# Patient Record
Sex: MALE | Race: WHITE | NOT HISPANIC OR LATINO | Employment: OTHER | URBAN - METROPOLITAN AREA
[De-identification: names, ages, dates, MRNs, and addresses within clinical notes are randomized per-mention and may not be internally consistent; named-entity substitution may affect disease eponyms.]

---

## 2023-01-29 PROBLEM — G47.33 OSA (OBSTRUCTIVE SLEEP APNEA): Status: ACTIVE | Noted: 2023-01-29

## 2023-01-29 PROBLEM — I51.7 LEFT VENTRICULAR HYPERTROPHY: Status: ACTIVE | Noted: 2023-01-29

## 2023-01-29 PROBLEM — R00.2 PALPITATIONS: Status: ACTIVE | Noted: 2023-01-29

## 2023-01-29 PROBLEM — D16.4 OSTEOMA OF EAR CANAL: Status: ACTIVE | Noted: 2023-01-29

## 2023-01-29 PROBLEM — R79.89 HIGH SERUM CREATINE: Status: ACTIVE | Noted: 2023-01-29

## 2023-01-29 PROBLEM — K64.5 EXTERNAL HEMORRHOID, THROMBOSED: Status: ACTIVE | Noted: 2023-01-29

## 2023-01-29 PROBLEM — I10 ESSENTIAL HYPERTENSION: Status: ACTIVE | Noted: 2023-01-29

## 2023-01-29 PROBLEM — E78.5 BORDERLINE HYPERLIPIDEMIA: Status: ACTIVE | Noted: 2023-01-29

## 2023-01-29 PROBLEM — R14.0 BLOATING: Status: ACTIVE | Noted: 2023-01-29

## 2023-01-29 PROBLEM — R73.09 ELEVATED GLUCOSE: Status: ACTIVE | Noted: 2023-01-29

## 2023-01-29 PROBLEM — I48.0 PAF (PAROXYSMAL ATRIAL FIBRILLATION) (MULTI): Status: ACTIVE | Noted: 2023-01-29

## 2023-01-29 PROBLEM — I71.20 THORACIC AORTIC ANEURYSM (TAA) (CMS-HCC): Status: ACTIVE | Noted: 2023-01-29

## 2023-01-29 PROBLEM — L30.9 DERMATITIS: Status: ACTIVE | Noted: 2023-01-29

## 2023-01-29 PROBLEM — R39.12 POOR URINARY STREAM: Status: ACTIVE | Noted: 2023-01-29

## 2023-01-29 RX ORDER — AMLODIPINE BESYLATE 5 MG/1
1 TABLET ORAL DAILY
COMMUNITY

## 2023-01-29 RX ORDER — METOPROLOL TARTRATE 25 MG/1
0.5 TABLET, FILM COATED ORAL 2 TIMES DAILY
COMMUNITY

## 2023-03-14 ENCOUNTER — OFFICE VISIT (OUTPATIENT)
Dept: PRIMARY CARE | Facility: CLINIC | Age: 68
End: 2023-03-14
Payer: MEDICARE

## 2023-03-14 VITALS
DIASTOLIC BLOOD PRESSURE: 82 MMHG | TEMPERATURE: 97.6 F | SYSTOLIC BLOOD PRESSURE: 128 MMHG | BODY MASS INDEX: 31.38 KG/M2 | WEIGHT: 254.4 LBS

## 2023-03-14 DIAGNOSIS — R39.12 POOR URINARY STREAM: ICD-10-CM

## 2023-03-14 DIAGNOSIS — G47.33 OSA (OBSTRUCTIVE SLEEP APNEA): ICD-10-CM

## 2023-03-14 DIAGNOSIS — I48.0 PAF (PAROXYSMAL ATRIAL FIBRILLATION) (MULTI): ICD-10-CM

## 2023-03-14 DIAGNOSIS — K76.0 FATTY LIVER: ICD-10-CM

## 2023-03-14 DIAGNOSIS — I71.20 THORACIC AORTIC ANEURYSM WITHOUT RUPTURE, UNSPECIFIED PART (CMS-HCC): ICD-10-CM

## 2023-03-14 DIAGNOSIS — R73.09 ELEVATED GLUCOSE: ICD-10-CM

## 2023-03-14 DIAGNOSIS — I10 ESSENTIAL HYPERTENSION: Primary | ICD-10-CM

## 2023-03-14 DIAGNOSIS — E78.5 BORDERLINE HYPERLIPIDEMIA: ICD-10-CM

## 2023-03-14 PROBLEM — R14.0 BLOATING: Status: RESOLVED | Noted: 2023-01-29 | Resolved: 2023-03-14

## 2023-03-14 PROBLEM — K64.5 EXTERNAL HEMORRHOID, THROMBOSED: Status: RESOLVED | Noted: 2023-01-29 | Resolved: 2023-03-14

## 2023-03-14 PROBLEM — R00.2 PALPITATIONS: Status: RESOLVED | Noted: 2023-01-29 | Resolved: 2023-03-14

## 2023-03-14 PROCEDURE — 1159F MED LIST DOCD IN RCRD: CPT | Performed by: INTERNAL MEDICINE

## 2023-03-14 PROCEDURE — 3079F DIAST BP 80-89 MM HG: CPT | Performed by: INTERNAL MEDICINE

## 2023-03-14 PROCEDURE — 1036F TOBACCO NON-USER: CPT | Performed by: INTERNAL MEDICINE

## 2023-03-14 PROCEDURE — 99214 OFFICE O/P EST MOD 30 MIN: CPT | Performed by: INTERNAL MEDICINE

## 2023-03-14 PROCEDURE — 3074F SYST BP LT 130 MM HG: CPT | Performed by: INTERNAL MEDICINE

## 2023-03-14 ASSESSMENT — PATIENT HEALTH QUESTIONNAIRE - PHQ9
2. FEELING DOWN, DEPRESSED OR HOPELESS: NOT AT ALL
SUM OF ALL RESPONSES TO PHQ9 QUESTIONS 1 AND 2: 0
1. LITTLE INTEREST OR PLEASURE IN DOING THINGS: NOT AT ALL

## 2023-03-14 NOTE — PATIENT INSTRUCTIONS
Have blood work in 2 to 3 months.  Keep up the excellent lifestyle changes.  Exercise.  Please make an appointment with your gastroenterologist for a follow-up colonoscopy.  Lets get back together in 6 months

## 2023-03-14 NOTE — PROGRESS NOTES
Subjective   Patient ID: Britton Hercules is a 67 y.o. male who presents for Follow-up (6 MONTH).    HPI   #1 hypertension-working on Dash diet.  Weight trending down.  Following closely with cardiology  #2 MARSHA-using AutoPap nightly.  #3 LVH-on hypertensive treatment.  Following closely with cardiology   #4 tooth abscess- resolved. f/u dentist.   #5 elevated glucose-weight trending down   #6 health maintenance  #7 LBP after a fall-resolved  #8 PAF- on OAC. doing well. f/u cards.  No bleeding  #9 TAA- f/u cards, ultrasound of abdomen normal.  Underwent CTA recently by cardiology   #10 colon polyps-did not have scope last year.  No GI complaints    Review of Systems all systems reviewed and negative except as per history of present illness    Objective   /82   Temp 36.4 °C (97.6 °F)   Wt 115 kg (254 lb 6.4 oz)   BMI 31.38 kg/m²     Physical Exam  Constitutional:       General: He is not in acute distress.     Appearance: Normal appearance. He is not ill-appearing or toxic-appearing.   Cardiovascular:      Rate and Rhythm: Normal rate and regular rhythm.      Heart sounds: No murmur heard.  Pulmonary:      Effort: Pulmonary effort is normal.      Breath sounds: Normal breath sounds.   Abdominal:      General: Abdomen is flat.      Palpations: Abdomen is soft. There is no mass.      Tenderness: There is no abdominal tenderness.   Neurological:      Mental Status: He is alert and oriented to person, place, and time.   Psychiatric:         Mood and Affect: Mood normal.         Thought Content: Thought content normal.         Judgment: Judgment normal.       Lab Results   Component Value Date    WBC 6.8 09/20/2022    HGB 14.8 09/20/2022    HCT 44.3 09/20/2022     09/20/2022    CHOL 180 09/20/2022    TRIG 91 09/20/2022    HDL 43.3 09/20/2022    ALT 25 09/20/2022    AST 16 01/27/2019     09/20/2022    K 4.4 09/20/2022     09/20/2022    CREATININE 1.22 09/20/2022    BUN 23 09/20/2022    CO2 27  09/20/2022    PSA 0.75 09/20/2022    INR 1.1 01/27/2019    HGBA1C 5.6 09/20/2022     === 10/04/22 ===    US ABDOMEN COMPLETE    - Impression -  1.  Hepatic steatosis.  2. Poor visualization of the pancreas.    Otherwise, limited, but grossly unremarkable ultrasound of the  abdomen.     Assessment/Plan   Problem List Items Addressed This Visit          Nervous    MARSHA (obstructive sleep apnea)       Circulatory    Essential hypertension - Primary    Relevant Orders    Basic Metabolic Panel    CBC    PAF (paroxysmal atrial fibrillation) (CMS/HCC)    Thoracic aortic aneurysm (TAA)       Other    Borderline hyperlipidemia    Relevant Orders    Alanine Aminotransferase    Lipid Panel    Elevated glucose    Relevant Orders    Hemoglobin A1C    Poor urinary stream    Relevant Orders    Prostate Specific Antigen     Other Visit Diagnoses       Fatty liver              #1 hypertension- stable on low dose amlodipine. Diet and exercise reviewed. Continue to check at home.   #2 MARSHA- moderate severity. Doing well, continue AutoPap 5-15.    #3 LVH- stable. Blood pressure well-controlled  #4 tooth abscess- resolved. f/u dentist.   #5 elevated glucose- diet and exercise reviewed. labs  #6 health maintenance- check basic labs. Recommend flu vaccine, he declines. Also declines shingles vaccine. Patient had colonoscopy (+polyps) 4/17--> pending now. reviewed importance w/ pt. stressed.   #7 LBP after a fall-normal exam. Resolving. Offered physical therapy. Declines.  #8 PAF- on OAC. doing well. f/u cards.   #9 TAA- f/u cards,   #10 colon polyps- due for colonoscopy last year, reviewed.  He will call and sched ASAP.  Stressed importance  #11-fatty liver on ultrasound.  Reviewed.  Follow labs.  Diet and exercise/low-carb/sugar discussed.     rec covid/flu/shingrix.clines- he declines

## 2023-09-14 ENCOUNTER — OFFICE VISIT (OUTPATIENT)
Dept: PRIMARY CARE | Facility: CLINIC | Age: 68
End: 2023-09-14
Payer: MEDICARE

## 2023-09-14 VITALS
DIASTOLIC BLOOD PRESSURE: 80 MMHG | BODY MASS INDEX: 32.14 KG/M2 | SYSTOLIC BLOOD PRESSURE: 118 MMHG | TEMPERATURE: 97.4 F | WEIGHT: 260.6 LBS

## 2023-09-14 DIAGNOSIS — Z00.00 REGULAR CHECK-UP: Primary | ICD-10-CM

## 2023-09-14 DIAGNOSIS — G47.33 OSA (OBSTRUCTIVE SLEEP APNEA): ICD-10-CM

## 2023-09-14 DIAGNOSIS — E78.5 BORDERLINE HYPERLIPIDEMIA: ICD-10-CM

## 2023-09-14 DIAGNOSIS — R39.12 POOR URINARY STREAM: ICD-10-CM

## 2023-09-14 DIAGNOSIS — H66.90 ACUTE OTITIS MEDIA, UNSPECIFIED OTITIS MEDIA TYPE: ICD-10-CM

## 2023-09-14 DIAGNOSIS — I10 ESSENTIAL HYPERTENSION: ICD-10-CM

## 2023-09-14 DIAGNOSIS — R79.89 HIGH SERUM CREATINE: ICD-10-CM

## 2023-09-14 DIAGNOSIS — I48.0 PAF (PAROXYSMAL ATRIAL FIBRILLATION) (MULTI): ICD-10-CM

## 2023-09-14 DIAGNOSIS — R73.09 ELEVATED GLUCOSE: ICD-10-CM

## 2023-09-14 DIAGNOSIS — E66.9 CLASS 1 OBESITY WITH SERIOUS COMORBIDITY AND BODY MASS INDEX (BMI) OF 32.0 TO 32.9 IN ADULT, UNSPECIFIED OBESITY TYPE: ICD-10-CM

## 2023-09-14 PROCEDURE — 1170F FXNL STATUS ASSESSED: CPT | Performed by: INTERNAL MEDICINE

## 2023-09-14 PROCEDURE — 1160F RVW MEDS BY RX/DR IN RCRD: CPT | Performed by: INTERNAL MEDICINE

## 2023-09-14 PROCEDURE — 3079F DIAST BP 80-89 MM HG: CPT | Performed by: INTERNAL MEDICINE

## 2023-09-14 PROCEDURE — 1159F MED LIST DOCD IN RCRD: CPT | Performed by: INTERNAL MEDICINE

## 2023-09-14 PROCEDURE — G0439 PPPS, SUBSEQ VISIT: HCPCS | Performed by: INTERNAL MEDICINE

## 2023-09-14 PROCEDURE — 3074F SYST BP LT 130 MM HG: CPT | Performed by: INTERNAL MEDICINE

## 2023-09-14 PROCEDURE — 1036F TOBACCO NON-USER: CPT | Performed by: INTERNAL MEDICINE

## 2023-09-14 PROCEDURE — 3008F BODY MASS INDEX DOCD: CPT | Performed by: INTERNAL MEDICINE

## 2023-09-14 RX ORDER — AMOXICILLIN 875 MG/1
875 TABLET, FILM COATED ORAL 2 TIMES DAILY
Qty: 10 TABLET | Refills: 0 | Status: SHIPPED | OUTPATIENT
Start: 2023-09-14 | End: 2023-09-19

## 2023-09-14 ASSESSMENT — PATIENT HEALTH QUESTIONNAIRE - PHQ9
2. FEELING DOWN, DEPRESSED OR HOPELESS: NOT AT ALL
1. LITTLE INTEREST OR PLEASURE IN DOING THINGS: NOT AT ALL
2. FEELING DOWN, DEPRESSED OR HOPELESS: NOT AT ALL
SUM OF ALL RESPONSES TO PHQ9 QUESTIONS 1 AND 2: 0
1. LITTLE INTEREST OR PLEASURE IN DOING THINGS: NOT AT ALL
SUM OF ALL RESPONSES TO PHQ9 QUESTIONS 1 AND 2: 0

## 2023-09-14 ASSESSMENT — ACTIVITIES OF DAILY LIVING (ADL)
BATHING: INDEPENDENT
GROCERY_SHOPPING: INDEPENDENT
TAKING_MEDICATION: INDEPENDENT
DRESSING: INDEPENDENT
DOING_HOUSEWORK: INDEPENDENT
MANAGING_FINANCES: INDEPENDENT

## 2023-09-14 NOTE — PROGRESS NOTES
Subjective   Reason for Visit: Britton Hercules is an 68 y.o. male here for a Medicare Wellness visit.     Past Medical, Surgical, and Family History reviewed and updated in chart.    Reviewed all medications by prescribing practitioner or clinical pharmacist (such as prescriptions, OTCs, herbal therapies and supplements) and documented in the medical record.    HPI  Overall well   +exercise   #1 hypertension-working on Dash diet.  Weight trending down.  Following closely with cardiology  #2 MARSHA-using AutoPap nightly.  #3 LVH-on hypertensive treatment.  Following closely with cardiology   #4 tooth abscess- resolved. f/u dentist.   #5 elevated glucose-weight stable  #6 health maintenance  #7 LBP after a fall-resolved  #8 PAF- on OAC. doing well. f/u cards.  No bleeding  #9 TAA- f/u cards, ultrasound of abdomen normal.  Underwent CTA recently by cardiology   #10 colon polyps-did not have scope last year.  No GI complaints    Patient Care Team:  Malaika Rubio MD as PCP - General  Malaika Rubio MD as PCP [facility]     Review of Systems    Objective   Vitals:  /80   Temp 36.3 °C (97.4 °F)   Wt 118 kg (260 lb 9.6 oz)   BMI 32.14 kg/m²       Physical Exam      Lab Results   Component Value Date    WBC 6.8 09/20/2022    HGB 14.8 09/20/2022    HCT 44.3 09/20/2022     09/20/2022    CHOL 180 09/20/2022    TRIG 91 09/20/2022    HDL 43.3 09/20/2022    ALT 25 09/20/2022    AST 16 01/27/2019     09/20/2022    K 4.4 09/20/2022     09/20/2022    CREATININE 1.22 09/20/2022    BUN 23 09/20/2022    CO2 27 09/20/2022    PSA 0.75 09/20/2022    INR 1.1 01/27/2019    HGBA1C 5.6 09/20/2022       Assessment/Plan   Problem List Items Addressed This Visit    None    #1 hypertension- stable on low dose amlodipine. Diet and exercise reviewed. Continue to check at home.   #2 MARSHA- moderate severity. Doing well, continue AutoPap 5-15.    #3 LVH- stable. Blood pressure well-controlled  #4 tooth  abscess- resolved. f/u dentist.   #5 elevated glucose- diet and exercise reviewed. labs  #6 health maintenance- check basic labs. Recommend flu vaccine, he declines. Also declines shingles vaccine. Patient had colonoscopy (+polyps) 4/17--> pending now. reviewed importance w/ pt. stressed.   #7 LBP after a fall-normal exam. Resolving. Offered physical therapy. Declines.  #8 PAF- on OAC. doing well. f/u cards.   #9 TAA- f/u cards,   #10 colon polyps- due for colonoscopy last year, reviewed.  He will call and sched ASAP.  Stressed importance again!  Reviewed risk of delayed dx of colorectal CA   #11-fatty liver on ultrasound.  Reviewed.  Follow labs.  Diet and exercise/low-carb/sugar discussed.     rec covid/flu/shingrix/Prevnar 20--reviewed at length.  Discussed indications.  He voiced understanding but declines.

## 2023-09-20 ENCOUNTER — LAB (OUTPATIENT)
Dept: LAB | Facility: LAB | Age: 68
End: 2023-09-20
Payer: MEDICARE

## 2023-09-20 DIAGNOSIS — R39.12 POOR URINARY STREAM: ICD-10-CM

## 2023-09-20 DIAGNOSIS — I48.0 PAF (PAROXYSMAL ATRIAL FIBRILLATION) (MULTI): ICD-10-CM

## 2023-09-20 DIAGNOSIS — R73.09 ELEVATED GLUCOSE: ICD-10-CM

## 2023-09-20 DIAGNOSIS — E78.5 BORDERLINE HYPERLIPIDEMIA: ICD-10-CM

## 2023-09-20 DIAGNOSIS — Z00.00 REGULAR CHECK-UP: ICD-10-CM

## 2023-09-20 LAB
ALANINE AMINOTRANSFERASE (SGPT) (U/L) IN SER/PLAS: 22 U/L (ref 10–52)
ANION GAP IN SER/PLAS: 13 MMOL/L (ref 10–20)
CALCIUM (MG/DL) IN SER/PLAS: 9.6 MG/DL (ref 8.6–10.3)
CARBON DIOXIDE, TOTAL (MMOL/L) IN SER/PLAS: 28 MMOL/L (ref 21–32)
CHLORIDE (MMOL/L) IN SER/PLAS: 103 MMOL/L (ref 98–107)
CHOLESTEROL (MG/DL) IN SER/PLAS: 188 MG/DL (ref 0–199)
CHOLESTEROL IN HDL (MG/DL) IN SER/PLAS: 46 MG/DL
CHOLESTEROL/HDL RATIO: 4.1
CREATININE (MG/DL) IN SER/PLAS: 1.35 MG/DL (ref 0.5–1.3)
ERYTHROCYTE DISTRIBUTION WIDTH (RATIO) BY AUTOMATED COUNT: 12 % (ref 11.5–14.5)
ERYTHROCYTE MEAN CORPUSCULAR HEMOGLOBIN CONCENTRATION (G/DL) BY AUTOMATED: 33.2 G/DL (ref 32–36)
ERYTHROCYTE MEAN CORPUSCULAR VOLUME (FL) BY AUTOMATED COUNT: 100 FL (ref 80–100)
ERYTHROCYTES (10*6/UL) IN BLOOD BY AUTOMATED COUNT: 4.52 X10E12/L (ref 4.5–5.9)
GFR MALE: 57 ML/MIN/1.73M2
GLUCOSE (MG/DL) IN SER/PLAS: 93 MG/DL (ref 74–99)
HEMATOCRIT (%) IN BLOOD BY AUTOMATED COUNT: 45.2 % (ref 41–52)
HEMOGLOBIN (G/DL) IN BLOOD: 15 G/DL (ref 13.5–17.5)
HEPATITIS C VIRUS AB PRESENCE IN SERUM: NONREACTIVE
LDL: 126 MG/DL (ref 0–99)
LEUKOCYTES (10*3/UL) IN BLOOD BY AUTOMATED COUNT: 7.8 X10E9/L (ref 4.4–11.3)
PLATELETS (10*3/UL) IN BLOOD AUTOMATED COUNT: 235 X10E9/L (ref 150–450)
POTASSIUM (MMOL/L) IN SER/PLAS: 4.6 MMOL/L (ref 3.5–5.3)
PROSTATE SPECIFIC AG (NG/ML) IN SER/PLAS: 0.55 NG/ML (ref 0–4)
SODIUM (MMOL/L) IN SER/PLAS: 139 MMOL/L (ref 136–145)
TRIGLYCERIDE (MG/DL) IN SER/PLAS: 78 MG/DL (ref 0–149)
UREA NITROGEN (MG/DL) IN SER/PLAS: 19 MG/DL (ref 6–23)
VLDL: 16 MG/DL (ref 0–40)

## 2023-09-20 PROCEDURE — 83036 HEMOGLOBIN GLYCOSYLATED A1C: CPT

## 2023-09-20 PROCEDURE — 36415 COLL VENOUS BLD VENIPUNCTURE: CPT

## 2023-09-20 PROCEDURE — 85027 COMPLETE CBC AUTOMATED: CPT

## 2023-09-20 PROCEDURE — 80048 BASIC METABOLIC PNL TOTAL CA: CPT

## 2023-09-20 PROCEDURE — 84460 ALANINE AMINO (ALT) (SGPT): CPT

## 2023-09-20 PROCEDURE — 86803 HEPATITIS C AB TEST: CPT

## 2023-09-20 PROCEDURE — 80061 LIPID PANEL: CPT

## 2023-09-20 PROCEDURE — 84153 ASSAY OF PSA TOTAL: CPT

## 2023-09-21 LAB
ESTIMATED AVERAGE GLUCOSE FOR HBA1C: 117 MG/DL
HEMOGLOBIN A1C/HEMOGLOBIN TOTAL IN BLOOD: 5.7 %

## 2024-03-26 ENCOUNTER — OFFICE VISIT (OUTPATIENT)
Dept: PRIMARY CARE | Facility: CLINIC | Age: 69
End: 2024-03-26
Payer: MEDICARE

## 2024-03-26 VITALS
WEIGHT: 267 LBS | SYSTOLIC BLOOD PRESSURE: 120 MMHG | BODY MASS INDEX: 32.93 KG/M2 | TEMPERATURE: 97.6 F | DIASTOLIC BLOOD PRESSURE: 78 MMHG

## 2024-03-26 DIAGNOSIS — K76.0 FATTY LIVER: ICD-10-CM

## 2024-03-26 DIAGNOSIS — I71.20 THORACIC AORTIC ANEURYSM WITHOUT RUPTURE, UNSPECIFIED PART (CMS-HCC): ICD-10-CM

## 2024-03-26 DIAGNOSIS — I10 ESSENTIAL HYPERTENSION: Primary | ICD-10-CM

## 2024-03-26 DIAGNOSIS — E78.5 BORDERLINE HYPERLIPIDEMIA: ICD-10-CM

## 2024-03-26 DIAGNOSIS — R73.09 ELEVATED GLUCOSE: ICD-10-CM

## 2024-03-26 DIAGNOSIS — Z12.11 ENCOUNTER FOR SCREENING FOR MALIGNANT NEOPLASM OF COLON: ICD-10-CM

## 2024-03-26 DIAGNOSIS — I48.0 PAF (PAROXYSMAL ATRIAL FIBRILLATION) (MULTI): ICD-10-CM

## 2024-03-26 PROCEDURE — 99214 OFFICE O/P EST MOD 30 MIN: CPT | Performed by: INTERNAL MEDICINE

## 2024-03-26 PROCEDURE — 3078F DIAST BP <80 MM HG: CPT | Performed by: INTERNAL MEDICINE

## 2024-03-26 PROCEDURE — 1159F MED LIST DOCD IN RCRD: CPT | Performed by: INTERNAL MEDICINE

## 2024-03-26 PROCEDURE — 1036F TOBACCO NON-USER: CPT | Performed by: INTERNAL MEDICINE

## 2024-03-26 PROCEDURE — 1160F RVW MEDS BY RX/DR IN RCRD: CPT | Performed by: INTERNAL MEDICINE

## 2024-03-26 PROCEDURE — 3074F SYST BP LT 130 MM HG: CPT | Performed by: INTERNAL MEDICINE

## 2024-03-26 ASSESSMENT — PATIENT HEALTH QUESTIONNAIRE - PHQ9
1. LITTLE INTEREST OR PLEASURE IN DOING THINGS: NOT AT ALL
SUM OF ALL RESPONSES TO PHQ9 QUESTIONS 1 AND 2: 0
2. FEELING DOWN, DEPRESSED OR HOPELESS: NOT AT ALL

## 2024-03-26 NOTE — PATIENT INSTRUCTIONS
Please have lab works today.  Redouble your efforts on a low-fat/sugar/carbohydrate diet.  Please schedule your colonoscopy ASAP.  Let me know if you need any assistance.  Come back to see me in about 6 months.

## 2024-03-26 NOTE — PROGRESS NOTES
Subjective   Reason for Visit: Britton Hercules is an 68 y.o. male here for f/u      Past Medical, Surgical, and Family History reviewed and updated in chart.    Reviewed all medications by prescribing practitioner or clinical pharmacist (such as prescriptions, OTCs, herbal therapies and supplements) and documented in the medical record.    HPI  Overall well   +exercise until recently, wt trending up.   #1 hypertension-working on Dash diet.  Weight trending up.  Following closely with cardiology  #2 MARSHA-using AutoPap nightly.  #3 LVH-on hypertensive treatment.  Following closely with cardiology   #4 tooth abscess- resolved. f/u dentist.   #5 elevated glucose-weight stable  #6 health maintenance  #7 LBP after a fall-resolved  #8 PAF- on OAC. doing well. f/u cards.  No bleeding  #9 TAA- f/u cards, ultrasound of abdomen normal.  Underwent CTA recently by cardiology   #10 colon polyps-did not have scope last year.  No GI complaints    Patient Care Team:  Malaika Rubio MD as PCP - General  Malaika Rubio MD as PCP - [facility] Attributed Provider     Review of Systems    Objective   Vitals:  /78 (BP Location: Right arm, Patient Position: Sitting, BP Cuff Size: Large adult)   Temp 36.4 °C (97.6 °F)   Wt 121 kg (267 lb)   BMI 32.93 kg/m²       Physical Exam      Lab Results   Component Value Date    WBC 7.8 09/20/2023    HGB 15.0 09/20/2023    HCT 45.2 09/20/2023     09/20/2023    CHOL 188 09/20/2023    TRIG 78 09/20/2023    HDL 46.0 09/20/2023    ALT 22 09/20/2023    AST 16 01/27/2019     09/20/2023    K 4.6 09/20/2023     09/20/2023    CREATININE 1.35 (H) 09/20/2023    BUN 19 09/20/2023    CO2 28 09/20/2023    PSA 0.55 09/20/2023    INR 1.1 01/27/2019    HGBA1C 5.7 (A) 09/20/2023       Assessment/Plan   Problem List Items Addressed This Visit    None    #1 hypertension- stable on low dose amlodipine. Diet and exercise reviewed. Continue to check at home.   #2 MARSHA- moderate severity. Doing well,  continue AutoPap 5-15.    #3 LVH- stable. Blood pressure well-controlled  #4 tooth abscess- resolved. f/u dentist.   #5 elevated glucose- diet and exercise reviewed. labs  #6 health maintenance- check basic labs.  #7 LBP after a fall-normal exam. Resolving. Offered physical therapy. Declines.  #8 PAF- on OAC. doing well. f/u cards.   #9 TAA- f/u cards,   #10 colon polyps- past- due for colonoscopy, reviewed.  He will call and sched ASAP.  Stressed importance again!  Reviewed risk of delayed dx of colorectal CA   #11-fatty liver on ultrasound.  Reviewed.  Follow labs.  Diet and exercise/low-carb/sugar discussed.     rec covid/flu/shingrix/Prevnar 20--reviewed,  He voiced understanding but declines.

## 2024-04-15 ENCOUNTER — LAB (OUTPATIENT)
Dept: LAB | Facility: LAB | Age: 69
End: 2024-04-15
Payer: MEDICARE

## 2024-04-15 DIAGNOSIS — E78.5 BORDERLINE HYPERLIPIDEMIA: ICD-10-CM

## 2024-04-15 DIAGNOSIS — K76.0 FATTY LIVER: ICD-10-CM

## 2024-04-15 DIAGNOSIS — R73.09 ELEVATED GLUCOSE: ICD-10-CM

## 2024-04-15 DIAGNOSIS — I10 ESSENTIAL HYPERTENSION: ICD-10-CM

## 2024-04-15 LAB
ALT SERPL W P-5'-P-CCNC: 33 U/L (ref 10–52)
ANION GAP SERPL CALC-SCNC: 13 MMOL/L (ref 10–20)
BUN SERPL-MCNC: 20 MG/DL (ref 6–23)
CALCIUM SERPL-MCNC: 9.1 MG/DL (ref 8.6–10.3)
CHLORIDE SERPL-SCNC: 104 MMOL/L (ref 98–107)
CHOLEST SERPL-MCNC: 196 MG/DL (ref 0–199)
CHOLESTEROL/HDL RATIO: 4.5
CO2 SERPL-SCNC: 27 MMOL/L (ref 21–32)
CREAT SERPL-MCNC: 1.2 MG/DL (ref 0.5–1.3)
EGFRCR SERPLBLD CKD-EPI 2021: 66 ML/MIN/1.73M*2
GLUCOSE SERPL-MCNC: 88 MG/DL (ref 74–99)
HDLC SERPL-MCNC: 43.5 MG/DL
LDLC SERPL CALC-MCNC: 135 MG/DL
NON HDL CHOLESTEROL: 153 MG/DL (ref 0–149)
POTASSIUM SERPL-SCNC: 4.5 MMOL/L (ref 3.5–5.3)
SODIUM SERPL-SCNC: 139 MMOL/L (ref 136–145)
TRIGL SERPL-MCNC: 90 MG/DL (ref 0–149)
TSH SERPL-ACNC: 2.19 MIU/L (ref 0.44–3.98)
VLDL: 18 MG/DL (ref 0–40)

## 2024-04-15 PROCEDURE — 80061 LIPID PANEL: CPT

## 2024-04-15 PROCEDURE — 80048 BASIC METABOLIC PNL TOTAL CA: CPT

## 2024-04-15 PROCEDURE — 83036 HEMOGLOBIN GLYCOSYLATED A1C: CPT

## 2024-04-15 PROCEDURE — 84443 ASSAY THYROID STIM HORMONE: CPT

## 2024-04-15 PROCEDURE — 84460 ALANINE AMINO (ALT) (SGPT): CPT

## 2024-04-15 PROCEDURE — 36415 COLL VENOUS BLD VENIPUNCTURE: CPT

## 2024-04-16 LAB
EST. AVERAGE GLUCOSE BLD GHB EST-MCNC: 126 MG/DL
HBA1C MFR BLD: 6 %

## 2024-09-24 ENCOUNTER — APPOINTMENT (OUTPATIENT)
Dept: PRIMARY CARE | Facility: CLINIC | Age: 69
End: 2024-09-24
Payer: MEDICARE

## 2024-09-24 ENCOUNTER — LAB (OUTPATIENT)
Dept: LAB | Facility: LAB | Age: 69
End: 2024-09-24
Payer: MEDICARE

## 2024-09-24 VITALS
DIASTOLIC BLOOD PRESSURE: 60 MMHG | HEIGHT: 76 IN | TEMPERATURE: 97.7 F | WEIGHT: 244 LBS | SYSTOLIC BLOOD PRESSURE: 110 MMHG | BODY MASS INDEX: 29.71 KG/M2

## 2024-09-24 DIAGNOSIS — Z00.00 WELL ADULT EXAM: Primary | ICD-10-CM

## 2024-09-24 DIAGNOSIS — Z00.00 WELL ADULT EXAM: ICD-10-CM

## 2024-09-24 DIAGNOSIS — I10 PRIMARY HYPERTENSION: ICD-10-CM

## 2024-09-24 LAB
ALT SERPL W P-5'-P-CCNC: 22 U/L (ref 10–52)
ANION GAP SERPL CALC-SCNC: 11 MMOL/L (ref 10–20)
BUN SERPL-MCNC: 18 MG/DL (ref 6–23)
CALCIUM SERPL-MCNC: 9 MG/DL (ref 8.6–10.3)
CHLORIDE SERPL-SCNC: 104 MMOL/L (ref 98–107)
CHOLEST SERPL-MCNC: 193 MG/DL (ref 0–199)
CHOLESTEROL/HDL RATIO: 3.9
CO2 SERPL-SCNC: 27 MMOL/L (ref 21–32)
CREAT SERPL-MCNC: 1.1 MG/DL (ref 0.5–1.3)
EGFRCR SERPLBLD CKD-EPI 2021: 73 ML/MIN/1.73M*2
ERYTHROCYTE [DISTWIDTH] IN BLOOD BY AUTOMATED COUNT: 11.8 % (ref 11.5–14.5)
EST. AVERAGE GLUCOSE BLD GHB EST-MCNC: 114 MG/DL
GLUCOSE SERPL-MCNC: 93 MG/DL (ref 74–99)
HBA1C MFR BLD: 5.6 %
HCT VFR BLD AUTO: 42.7 % (ref 41–52)
HDLC SERPL-MCNC: 49.7 MG/DL
HGB BLD-MCNC: 14.5 G/DL (ref 13.5–17.5)
LDLC SERPL CALC-MCNC: 125 MG/DL
MCH RBC QN AUTO: 33.2 PG (ref 26–34)
MCHC RBC AUTO-ENTMCNC: 34 G/DL (ref 32–36)
MCV RBC AUTO: 98 FL (ref 80–100)
NON HDL CHOLESTEROL: 143 MG/DL (ref 0–149)
NRBC BLD-RTO: 0 /100 WBCS (ref 0–0)
PLATELET # BLD AUTO: 226 X10*3/UL (ref 150–450)
POTASSIUM SERPL-SCNC: 4.3 MMOL/L (ref 3.5–5.3)
RBC # BLD AUTO: 4.37 X10*6/UL (ref 4.5–5.9)
SODIUM SERPL-SCNC: 138 MMOL/L (ref 136–145)
TRIGL SERPL-MCNC: 92 MG/DL (ref 0–149)
VLDL: 18 MG/DL (ref 0–40)
WBC # BLD AUTO: 6.8 X10*3/UL (ref 4.4–11.3)

## 2024-09-24 PROCEDURE — G0439 PPPS, SUBSEQ VISIT: HCPCS | Performed by: INTERNAL MEDICINE

## 2024-09-24 PROCEDURE — 1160F RVW MEDS BY RX/DR IN RCRD: CPT | Performed by: INTERNAL MEDICINE

## 2024-09-24 PROCEDURE — 1124F ACP DISCUSS-NO DSCNMKR DOCD: CPT | Performed by: INTERNAL MEDICINE

## 2024-09-24 PROCEDURE — 1159F MED LIST DOCD IN RCRD: CPT | Performed by: INTERNAL MEDICINE

## 2024-09-24 PROCEDURE — 3078F DIAST BP <80 MM HG: CPT | Performed by: INTERNAL MEDICINE

## 2024-09-24 PROCEDURE — 3008F BODY MASS INDEX DOCD: CPT | Performed by: INTERNAL MEDICINE

## 2024-09-24 PROCEDURE — 1170F FXNL STATUS ASSESSED: CPT | Performed by: INTERNAL MEDICINE

## 2024-09-24 PROCEDURE — 36415 COLL VENOUS BLD VENIPUNCTURE: CPT

## 2024-09-24 PROCEDURE — 1036F TOBACCO NON-USER: CPT | Performed by: INTERNAL MEDICINE

## 2024-09-24 PROCEDURE — 3074F SYST BP LT 130 MM HG: CPT | Performed by: INTERNAL MEDICINE

## 2024-09-24 ASSESSMENT — PATIENT HEALTH QUESTIONNAIRE - PHQ9
2. FEELING DOWN, DEPRESSED OR HOPELESS: NOT AT ALL
1. LITTLE INTEREST OR PLEASURE IN DOING THINGS: NOT AT ALL
2. FEELING DOWN, DEPRESSED OR HOPELESS: NOT AT ALL
SUM OF ALL RESPONSES TO PHQ9 QUESTIONS 1 AND 2: 0
SUM OF ALL RESPONSES TO PHQ9 QUESTIONS 1 AND 2: 0
1. LITTLE INTEREST OR PLEASURE IN DOING THINGS: NOT AT ALL

## 2024-09-24 ASSESSMENT — ACTIVITIES OF DAILY LIVING (ADL)
DRESSING: INDEPENDENT
GROCERY_SHOPPING: INDEPENDENT
BATHING: INDEPENDENT
DOING_HOUSEWORK: INDEPENDENT
TAKING_MEDICATION: INDEPENDENT
MANAGING_FINANCES: INDEPENDENT

## 2024-09-24 NOTE — PROGRESS NOTES
"Subjective   Reason for Visit: Britton Hercules is an 69 y.o. male here for a Medicare Wellness visit.          Reviewed all medications by prescribing practitioner or clinical pharmacist (such as prescriptions, OTCs, herbal therapies and supplements) and documented in the medical record.    HPI  Overall well   Note 3/26/24 reviewed  +exercise   On DASH diet x 3 mths.  Down ~30#  Feels good.   #1 hypertension-working on Dash diet.  Weight trending up.  Following closely with cardiology  #2 MARSHA-using AutoPap nightly.  #3 LVH-on hypertensive treatment.  Following closely with cardiology   #4 tooth abscess- resolved. f/u dentist.   #5 elevated glucose-weight down  #6 health maintenance  #7 LBP after a fall-resolved  #8 PAF- on OAC. doing well. f/u cards.  No bleeding  #9 TAA- f/u cards, ultrasound of abdomen normal.     #10 colon polyps- s/p scope 8/24        Patient Care Team:  Malaika Rubio MD as PCP - General  Mala[facility] MD as PCP [facility]     Review of Systems    Objective   Vitals:  /60   Temp 36.5 °C (97.7 °F)   Ht 1.918 m (6' 3.5\")   Wt 111 kg (244 lb)   BMI 30.10 kg/m²       Physical Exam  Constitutional:       General: He is not in acute distress.     Appearance: Normal appearance. He is not ill-appearing.   HENT:      Head: Normocephalic and atraumatic.      Right Ear: Tympanic membrane and ear canal normal.      Left Ear: Tympanic membrane and ear canal normal.      Nose: Nose normal.      Mouth/Throat:      Mouth: Mucous membranes are moist.      Pharynx: Oropharynx is clear.   Eyes:      General: No scleral icterus.     Extraocular Movements: Extraocular movements intact.      Conjunctiva/sclera: Conjunctivae normal.      Pupils: Pupils are equal, round, and reactive to light.   Cardiovascular:      Rate and Rhythm: Normal rate and regular rhythm.      Pulses: Normal pulses.      Heart sounds: No murmur heard.     No friction rub.   Pulmonary:      Effort: Pulmonary " effort is normal.      Breath sounds: Normal breath sounds. No rhonchi or rales.   Abdominal:      General: Abdomen is flat. Bowel sounds are normal. There is no distension.      Palpations: Abdomen is soft. There is no mass.      Tenderness: There is no abdominal tenderness.   Musculoskeletal:      Cervical back: Normal range of motion and neck supple.      Right lower leg: No edema.      Left lower leg: No edema.   Skin:     General: Skin is warm.   Neurological:      General: No focal deficit present.      Mental Status: He is alert and oriented to person, place, and time.      Cranial Nerves: No cranial nerve deficit.   Psychiatric:         Mood and Affect: Mood normal.         Behavior: Behavior normal.         Judgment: Judgment normal.       Lab Results   Component Value Date    WBC 7.8 09/20/2023    HGB 15.0 09/20/2023    HCT 45.2 09/20/2023     09/20/2023    CHOL 196 04/15/2024    TRIG 90 04/15/2024    HDL 43.5 04/15/2024    ALT 33 04/15/2024    AST 16 01/27/2019     04/15/2024    K 4.5 04/15/2024     04/15/2024    CREATININE 1.20 04/15/2024    BUN 20 04/15/2024    CO2 27 04/15/2024    TSH 2.19 04/15/2024    PSA 0.55 09/20/2023    INR 1.1 01/27/2019    HGBA1C 6.0 (H) 04/15/2024       Assessment & Plan  Well adult exam                   #1 hypertension- stable on low dose amlodipine. Diet and exercise reviewed. Continue to check at home- send me note in 2 weeks, consider reducing Norvasc. .   #2 MARSHA- moderate severity. Doing well, continue AutoPap 5-15.    #3 LVH- stable. Blood pressure well-controlled  #4 tooth abscess- resolved. f/u dentist.   #5 elevated glucose- diet and exercise reviewed. labs  #6 health maintenance- check basic labs.  #7 LBP after a fall-normal exam. Resolving. Offered physical therapy. Declines.  #8 PAF- on OAC. doing well. f/u cards.   #9 TAA- f/u cards,   #10 colon polyps- past-  scope 8/24, 2 polyps.   #11-fatty liver on ultrasound.  Reviewed.  Follow labs.  Diet  and exercise/low-carb/sugar discussed.     Reviewed elevated ASCVD risk score.  My recommendation is to begin statin.  Cardiology is also discussed this with him.  At this point he declines.  rec covid/flu/shingrix/Prevnar 20--reviewed,  He voiced understanding but declines.    f/u  6 mths

## 2025-03-24 ENCOUNTER — APPOINTMENT (OUTPATIENT)
Dept: PRIMARY CARE | Facility: CLINIC | Age: 70
End: 2025-03-24
Payer: MEDICARE

## 2025-03-24 VITALS
HEIGHT: 77 IN | SYSTOLIC BLOOD PRESSURE: 120 MMHG | TEMPERATURE: 96.7 F | WEIGHT: 269 LBS | DIASTOLIC BLOOD PRESSURE: 70 MMHG | BODY MASS INDEX: 31.76 KG/M2

## 2025-03-24 DIAGNOSIS — K76.0 FATTY LIVER: ICD-10-CM

## 2025-03-24 DIAGNOSIS — E55.9 VITAMIN D DEFICIENCY: ICD-10-CM

## 2025-03-24 DIAGNOSIS — I10 PRIMARY HYPERTENSION: ICD-10-CM

## 2025-03-24 DIAGNOSIS — R73.09 ELEVATED GLUCOSE: Primary | ICD-10-CM

## 2025-03-24 DIAGNOSIS — I48.0 PAROXYSMAL ATRIAL FIBRILLATION (MULTI): ICD-10-CM

## 2025-03-24 DIAGNOSIS — R53.83 OTHER FATIGUE: ICD-10-CM

## 2025-03-24 LAB
25(OH)D3+25(OH)D2 SERPL-MCNC: 25 NG/ML (ref 30–100)
ALT SERPL-CCNC: 38 U/L (ref 9–46)
ANION GAP SERPL CALCULATED.4IONS-SCNC: 10 MMOL/L (CALC) (ref 7–17)
AST SERPL-CCNC: 26 U/L (ref 10–35)
BUN SERPL-MCNC: 16 MG/DL (ref 7–25)
BUN/CREAT SERPL: NORMAL (CALC) (ref 6–22)
CALCIUM SERPL-MCNC: 9.2 MG/DL (ref 8.6–10.3)
CHLORIDE SERPL-SCNC: 105 MMOL/L (ref 98–110)
CO2 SERPL-SCNC: 26 MMOL/L (ref 20–32)
CREAT SERPL-MCNC: 1.16 MG/DL (ref 0.7–1.35)
EGFRCR SERPLBLD CKD-EPI 2021: 68 ML/MIN/1.73M2
GLUCOSE SERPL-MCNC: 89 MG/DL (ref 65–99)
POTASSIUM SERPL-SCNC: 4.3 MMOL/L (ref 3.5–5.3)
SODIUM SERPL-SCNC: 141 MMOL/L (ref 135–146)
TSH SERPL-ACNC: 3.11 MIU/L (ref 0.4–4.5)

## 2025-03-24 PROCEDURE — G2211 COMPLEX E/M VISIT ADD ON: HCPCS | Performed by: INTERNAL MEDICINE

## 2025-03-24 PROCEDURE — 1160F RVW MEDS BY RX/DR IN RCRD: CPT | Performed by: INTERNAL MEDICINE

## 2025-03-24 PROCEDURE — 1159F MED LIST DOCD IN RCRD: CPT | Performed by: INTERNAL MEDICINE

## 2025-03-24 PROCEDURE — 3008F BODY MASS INDEX DOCD: CPT | Performed by: INTERNAL MEDICINE

## 2025-03-24 PROCEDURE — 3078F DIAST BP <80 MM HG: CPT | Performed by: INTERNAL MEDICINE

## 2025-03-24 PROCEDURE — 1124F ACP DISCUSS-NO DSCNMKR DOCD: CPT | Performed by: INTERNAL MEDICINE

## 2025-03-24 PROCEDURE — 99214 OFFICE O/P EST MOD 30 MIN: CPT | Performed by: INTERNAL MEDICINE

## 2025-03-24 PROCEDURE — 3074F SYST BP LT 130 MM HG: CPT | Performed by: INTERNAL MEDICINE

## 2025-03-24 PROCEDURE — 1036F TOBACCO NON-USER: CPT | Performed by: INTERNAL MEDICINE

## 2025-03-24 ASSESSMENT — PATIENT HEALTH QUESTIONNAIRE - PHQ9
2. FEELING DOWN, DEPRESSED OR HOPELESS: NOT AT ALL
1. LITTLE INTEREST OR PLEASURE IN DOING THINGS: NOT AT ALL
SUM OF ALL RESPONSES TO PHQ9 QUESTIONS 1 AND 2: 0

## 2025-03-24 NOTE — PROGRESS NOTES
"Subjective   Reason for Visit: Britton Hercules is an 69 y.o. male here for f/u      Past Medical, Surgical, and Family History reviewed and updated in chart.    Reviewed all medications by prescribing practitioner or clinical pharmacist (such as prescriptions, OTCs, herbal therapies and supplements) and documented in the medical record.    HPI  Overall well   Note 9/24/24 reviewed  +exercise   Off DASH diet  Wt back up  Feels good.   #1 hypertension-working on Dash diet.  Weight trending up.  Following closely with cardiology  #2 MARSHA-using AutoPap nightly.  #3 LVH-on hypertensive treatment.  Following closely with cardiology   #4 tooth abscess- resolved. f/u dentist.   #5 elevated glucose-weight up  #6 health maintenance  #7 LBP after a fall-resolved  #8 PAF- on OAC. doing well. f/u cards.  No bleeding  #9 TAA- f/u cards, ultrasound of abdomen normal.     #10 colon polyps- s/p scope 8/24        Patient Care Team:  Malaika Rubio MD as PCP - General  Malaika [facility] MD as PCP [facility]     Review of Systems    Objective   Vitals:  /70 (BP Location: Left arm, Patient Position: Sitting)   Temp 35.9 °C (96.7 °F) (Temporal)   Ht 1.96 m (6' 5.17\")   Wt 122 kg (269 lb)   BMI 31.76 kg/m²       Physical Exam  Constitutional:       General: He is not in acute distress.     Appearance: Normal appearance. He is not ill-appearing.   HENT:      Head: Normocephalic and atraumatic.      Right Ear: Tympanic membrane and ear canal normal.      Left Ear: Tympanic membrane and ear canal normal.      Nose: Nose normal.      Mouth/Throat:      Mouth: Mucous membranes are moist.      Pharynx: Oropharynx is clear.   Eyes:      General: No scleral icterus.     Extraocular Movements: Extraocular movements intact.      Conjunctiva/sclera: Conjunctivae normal.      Pupils: Pupils are equal, round, and reactive to light.   Cardiovascular:      Rate and Rhythm: Normal rate and regular rhythm.      Pulses: Normal " pulses.      Heart sounds: No murmur heard.     No friction rub.   Pulmonary:      Effort: Pulmonary effort is normal.      Breath sounds: Normal breath sounds. No rhonchi or rales.   Abdominal:      General: Abdomen is flat. Bowel sounds are normal. There is no distension.      Palpations: Abdomen is soft. There is no mass.      Tenderness: There is no abdominal tenderness.   Musculoskeletal:      Cervical back: Normal range of motion and neck supple.      Right lower leg: No edema.      Left lower leg: No edema.   Skin:     General: Skin is warm.   Neurological:      General: No focal deficit present.      Mental Status: He is alert and oriented to person, place, and time.      Cranial Nerves: No cranial nerve deficit.   Psychiatric:         Mood and Affect: Mood normal.         Behavior: Behavior normal.         Judgment: Judgment normal.       Lab Results   Component Value Date    WBC 6.8 09/24/2024    HGB 14.5 09/24/2024    HCT 42.7 09/24/2024     09/24/2024    CHOL 193 09/24/2024    TRIG 92 09/24/2024    HDL 49.7 09/24/2024    ALT 22 09/24/2024    AST 16 01/27/2019     09/24/2024    K 4.3 09/24/2024     09/24/2024    CREATININE 1.10 09/24/2024    BUN 18 09/24/2024    CO2 27 09/24/2024    TSH 2.19 04/15/2024    PSA 0.55 09/20/2023    INR 1.1 01/27/2019    HGBA1C 5.6 09/24/2024       Assessment & Plan              #1 hypertension- stable on low dose amlodipine. Diet and exercise reviewed. Continue to check at home  #2 MARSHA- moderate severity. Doing well, continue AutoPap 5-15.    #3 LVH- stable. Blood pressure well-controlled  #4 tooth abscess- resolved. f/u dentist.   #5 elevated glucose- diet and exercise reviewed. labs  #6 health maintenance- check basic labs.  #7 LBP after a fall-normal exam. Resolving. Offered physical therapy. Declines.  #8 PAF- on OAC. doing well. f/u cards.   #9 TAA- f/u cards,   #10 colon polyps- past-  scope 8/24, 2 polyps.   #11-fatty liver on ultrasound.  Reviewed.   Follow labs.  Diet and exercise/low-carb/sugar discussed.     Reviewed elevated ASCVD risk score.  My recommendation is again to begin statin.  Cardiology has also discussed this with him.  --> he declines.  rec covid/flu/shingrix/Prevnar 20--reviewed,  He voiced understanding but declines.    f/u  6 mths

## 2025-03-25 LAB
ERYTHROCYTE [DISTWIDTH] IN BLOOD BY AUTOMATED COUNT: 12 % (ref 11–15)
EST. AVERAGE GLUCOSE BLD GHB EST-MCNC: 126 MG/DL
EST. AVERAGE GLUCOSE BLD GHB EST-SCNC: 7 MMOL/L
HBA1C MFR BLD: 6 % OF TOTAL HGB
HCT VFR BLD AUTO: 41.1 % (ref 38.5–50)
HGB BLD-MCNC: 13.9 G/DL (ref 13.2–17.1)
MCH RBC QN AUTO: 33.6 PG (ref 27–33)
MCHC RBC AUTO-ENTMCNC: 33.8 G/DL (ref 32–36)
MCV RBC AUTO: 99.3 FL (ref 80–100)
PLATELET # BLD AUTO: 191 THOUSAND/UL (ref 140–400)
PMV BLD REES-ECKER: 9.7 FL (ref 7.5–12.5)
RBC # BLD AUTO: 4.14 MILLION/UL (ref 4.2–5.8)
VIT B12 SERPL-MCNC: 399 PG/ML (ref 200–1100)
WBC # BLD AUTO: 5.4 THOUSAND/UL (ref 3.8–10.8)

## 2025-09-24 ENCOUNTER — APPOINTMENT (OUTPATIENT)
Dept: PRIMARY CARE | Facility: CLINIC | Age: 70
End: 2025-09-24
Payer: MEDICARE